# Patient Record
Sex: MALE | URBAN - METROPOLITAN AREA
[De-identification: names, ages, dates, MRNs, and addresses within clinical notes are randomized per-mention and may not be internally consistent; named-entity substitution may affect disease eponyms.]

---

## 2018-11-27 ENCOUNTER — OFFICE VISIT (OUTPATIENT)
Dept: CARDIOLOGY | Facility: CLINIC | Age: 43
End: 2018-11-27
Attending: INTERNAL MEDICINE
Payer: COMMERCIAL

## 2018-11-27 VITALS
OXYGEN SATURATION: 96 % | SYSTOLIC BLOOD PRESSURE: 131 MMHG | BODY MASS INDEX: 26.56 KG/M2 | HEIGHT: 72 IN | WEIGHT: 196.1 LBS | DIASTOLIC BLOOD PRESSURE: 88 MMHG | HEART RATE: 72 BPM

## 2018-11-27 DIAGNOSIS — R07.89 ATYPICAL CHEST PAIN: Primary | ICD-10-CM

## 2018-11-27 PROCEDURE — G0463 HOSPITAL OUTPT CLINIC VISIT: HCPCS | Mod: ZF

## 2018-11-27 PROCEDURE — 99205 OFFICE O/P NEW HI 60 MIN: CPT | Mod: ZP | Performed by: INTERNAL MEDICINE

## 2018-11-27 RX ORDER — ROSUVASTATIN CALCIUM 5 MG/1
5 TABLET, COATED ORAL DAILY
Qty: 90 TABLET | Refills: 3 | Status: SHIPPED | OUTPATIENT
Start: 2018-11-27

## 2018-11-27 RX ORDER — LORAZEPAM 1 MG/1
TABLET ORAL
Refills: 0 | COMMUNITY
Start: 2018-10-15

## 2018-11-27 RX ORDER — VALACYCLOVIR HYDROCHLORIDE 500 MG/1
TABLET, FILM COATED ORAL
Refills: 0 | COMMUNITY
Start: 2018-10-15

## 2018-11-27 RX ORDER — METOPROLOL TARTRATE 50 MG
TABLET ORAL
Qty: 2 TABLET | Refills: 0 | Status: SHIPPED | OUTPATIENT
Start: 2018-11-27 | End: 2019-02-01

## 2018-11-27 RX ORDER — CHLORAL HYDRATE 500 MG
2 CAPSULE ORAL DAILY
COMMUNITY

## 2018-11-27 ASSESSMENT — PAIN SCALES - GENERAL: PAINLEVEL: NO PAIN (0)

## 2018-11-27 NOTE — PATIENT INSTRUCTIONS
Patient Instructions:  Schedule coronary CT angiogram at your convenience.  Begin taking Crestor. RX sent to pharmacy.  Schedule follow up in 3 months for lab appointment and same day follow up with Dr. Levine.      It was a pleasure to see you in the cardiology clinic today.    We are encouraging the use of [employer] [facility] [employer] [facility] [employer] to communicate with your Healthcare Provider.  If you have any questions, call  Levi Tian LPN, at [phone].  Press Option #1 for the [facility], and then press Option #3 for nursing.      If you have an urgent need after hours (8:00 am to 4:30 pm) please call [phone] and ask for the cardiology fellow on call.      You are scheduled for a CT Coronary Angiogram at the [facility].   Please report to the [facility] Waiting Room in the [facility].    Follow these instructions:  1.The day before the test drink extra water and also on the day of the test.  2. Nothing to eat or drink except water 3 hours prior.  3. No caffeine, smoking, or strenuous activity before test.    4. You will be receiving contrast. You will need pre-treatment if you have allergies to contrast dye or shellfish.   5. You will need to have a current creatinine level within 30 days if you are over the age of 69, diabetic, or have a history of kidney problems.   6. HOLD these medications:   1. oral diabetic medications the morning of and wait 48 hours before  re-starting metformin.     2. Diuretic the day of.   3. NSAIDS (ibuprofen, naproxen) day of.  7. If your heart rate is above 90, please take this beta blocker the day of: metoprolol tartrate (LOPRESSOR)     8. Testing takes about 15 min. Please allow 2 hours for test as you may need medications to slow your heart rate for the test.      If your question concerns the above instructions, contact:  Levi Tian LPN  Nurse Care Coordinator- Heart Care  [phone]

## 2018-11-27 NOTE — MR AVS SNAPSHOT
After Visit Summary   11/27/2018    Brian Stratton    MRN: [id number]           Patient Information     Date Of Birth          1975        Visit Information        Provider Department      11/27/2018 1:00 PM ROCK Chiang MD [facility]        Today's Diagnoses     Atypical chest pain    -  1      Care Instructions    Patient Instructions:  Schedule coronary CT angiogram at your convenience.  Begin taking Crestor. RX sent to pharmacy.  Schedule follow up in 3 months for lab appointment and same day follow up with Dr. Levine.      It was a pleasure to see you in the cardiology clinic today.    We are encouraging the use of [employer] to communicate with your Healthcare Provider.  If you have any questions, call  Levi Tian LPN, at [phone].  Press Option #1 for the [facility], and then press Option #3 for nursing.      If you have an urgent need after hours (8:00 am to 4:30 pm) please call [phone] and ask for the cardiology fellow on call.      You are scheduled for a CT Coronary Angiogram at the [facility].   Please report to the [facility] Waiting Room in the [facility].    Follow these instructions:  1.The day before the test drink extra water and also on the day of the test.  2. Nothing to eat or drink except water 3 hours prior.  3. No caffeine, smoking, or strenuous activity before test.    4. You will be receiving contrast. You will need pre-treatment if you have allergies to contrast dye or shellfish.   5. You will need to have a current creatinine level within 30 days if you are over the age of 69, diabetic, or have a history of kidney problems.   6. HOLD these medications:   1. oral diabetic medications the morning of and wait 48 hours before  re-starting metformin.     2. Diuretic the day of.   3. NSAIDS (ibuprofen, naproxen) day of.  7. If your heart rate is above 90, please take this beta blocker  the day of: metoprolol tartrate (LOPRESSOR)     8. Testing takes about 15 min. Please allow 2 hours for test as you may need medications to slow your heart rate for the test.      If your question concerns the above instructions, contact:  Levi Tian LPN  Nurse Care Coordinator- Heart Care  [phone]              Follow-ups after your visit        Additional Services     Follow-Up with Cardiologist                 Follow-up notes from your care team     Return in about 3 months (around 2/27/2019).      Your next 10 appointments already scheduled     Dec 10, 2018 12:00 PM CST   CTA ANGIOGRAM CORONARY ARTERY with UUCT4, UU CV CT NURSE   [facility], [place], [place] ([facility], [facility])    [address] [facility] [address] [phone]           How do I prepare for my exam? (Food and drink instructions) Follow the instructions below: The day before your exam, drink extra fluids at least six 8-ounce glasses (unless your doctor wants you to restrict your fluids). No caffeine and no smoking the day of the test.  Do not eat or drink for 2 hours before your exam. You may take your morning medicines with small sips of water.  How do I prepare for my exam? (Other instructions) Follow the instructions below: If you take Viagra, Levitra or Cialis, stop taking it for 48 hours before your test.  For patients with diabetes: You may need a blood test (creatinine test) within 30 days of your exam; the Cardiologist/Radiologist may require you to get this test prior to your exam  If you are diabetic and take oral hypoglycemics, do not take them on the day of your test.  What should I wear: Please wear loose clothing, such as a sweat suit or jogging clothes. Avoid snaps, zippers and other metal. We may ask you to undress and put on a hospital gown.  How long does the exam take: Please allow two hours for this test.  What should I bring: Bring any scans or X-rays taken at other  hospitals, if similar tests were done. Also bring a list of your medicines, including vitamins, minerals and over-the-counter drugs. It is safest to leave personal items at home.  Do I need a : No  is needed.  What do I need to tell my doctor? Be sure to tell your doctor: * If you have any allergies, including any reaction to contrast. * If you are breastfeeding or there is a chance you are pregnant.  What should I do after the exam: No restrictions, You may resume normal activities.  What is this test: This test looks at your heart and heart arteries. A CT (computed tomography) scan is a series of pictures that allows us to look inside your body. Our scanner creates images of the body in cross sections, much like slices of bread. This helps us see any problems more clearly. Before the scan, we will give you contrast fluid (X-ray dye) through an IV (small needle in your arm). The contrast helps your blood vessels show up more clearly on the pictures.  Who should I call with questions: If you have any questions, please call the Imaging Department where you will have your exam. Directions, parking instructions, and other information is available on our website, [email or web address].            Feb 19, 2019  8:00 AM CST   Lab with  LAB   [facility] ([facility])    [address] [address] [phone]            Feb 19, 2019  8:30 AM CST   (Arrive by 8:15 AM)   Return Visit with ROCK Chiang MD   [facility] ([facility])    [address] [facility]  [address] [place] [id number] [phone]              Future tests that were ordered for you today     Open Future Orders        Priority Expected Expires Ordered    Radiologist Consult For Cardiology Routine 11/27/2018 11/27/2019 11/27/2018    Follow-Up with Cardiologist Routine 2/25/2019 11/27/2019 11/27/2018    Lipid panel reflex to direct LDL  "Fasting Routine 2019    Hepatic panel Routine 2019    CT Angiogram coronary artery Routine 2018            Who to contact     If you have questions or need follow up information about today's clinic visit or your schedule please contact [facility] directly at [phone].  Normal or non-critical lab and imaging results will be communicated to you by [employer], letter or phone within 4 business days after the clinic has received the results. If you do not hear from us within 7 days, please contact the clinic through [email or web address] or phone. If you have a critical or abnormal lab result, we will notify you by phone as soon as possible.  Submit refill requests through [employer] or call your pharmacy and they will forward the refill request to us. Please allow 3 business days for your refill to be completed.          Additional Information About Your Visit        [employer] Information     [employer] lets you send messages to your doctor, view your test results, renew your prescriptions, schedule appointments and more. To sign up, go to [email or web address] . Click on \"Log in\" on the left side of the screen, which will take you to the Welcome page. Then click on \"Sign up Now\" on the right side of the page.     You will be asked to enter the access code listed below, as well as some personal information. Please follow the directions to create your username and password.     Your access code is: [email or web address]  Expires: 2019  6:30 AM     Your access code will  in 90 days. If you need help or a new code, please call your [place] clinic or [phone].        Care EveryWhere ID     This is your Care EveryWhere ID. This could be used by other organizations to access your [place] medical records  [id number]        Your Vitals Were     Pulse Height Pulse Oximetry BMI (Body Mass Index)          72 1.829 m (6') 96% 26.6 kg/m2         " Blood Pressure from Last 3 Encounters:   11/27/18 131/88    Weight from Last 3 Encounters:   11/27/18 89 kg (196 lb 1.6 oz)                 Today's Medication Changes          These changes are accurate as of 11/27/18  2:05 PM.  If you have any questions, ask your nurse or doctor.               Start taking these medicines.        Dose/Directions    metoprolol tartrate 50 MG tablet   Commonly known as:  LOPRESSOR   Used for:  Atypical chest pain   Started by:  ROCK Chiang MD        Take 100 mg morning of procedure   Quantity:  2 tablet   Refills:  0       rosuvastatin 5 MG tablet   Commonly known as:  CRESTOR   Used for:  Atypical chest pain   Started by:  ROCK Chiang MD        Dose:  5 mg   Take 1 tablet (5 mg) by mouth daily   Quantity:  90 tablet   Refills:  3            Where to get your medicines      These medications were sent to [id number]  [address] - [address]   [address] [address]     Phone:  [phone]     metoprolol tartrate 50 MG tablet    rosuvastatin 5 MG tablet                Primary Care Provider Office Phone # Fax #    [facility] [phone]       [facility] [id number]  [place] [address]        Equal Access to Services     [facility] AH: Hadii aad ku hadasho Soomaali, waaxda luqadaha, qaybta kaalmada adeegyada, waxay yvanin hayrachel acharya . So [facility] [phone].    ATENCIÓN: Si habla español, tiene a johnson disposición servicios gratuitos de asistencia lingüística. ame al [phone].    We comply with applicable federal civil rights laws and [place] laws. We do not discriminate on the basis of race, color, national origin, age, disability, sex, sexual orientation, or gender identity.            Thank you!     Thank you for choosing [facility]  for your care. Our goal is always to provide you with excellent care. Hearing back from our patients is one  way we can continue to improve our services. Please take a few minutes to complete the written survey that you may receive in the mail after your visit with us. Thank you!             Your Updated Medication List - Protect others around you: Learn how to safely use, store and throw away your medicines at www.disposemymeds.org.          This list is accurate as of 11/27/18  2:05 PM.  Always use your most recent med list.                   Brand Name Dispense Instructions for use Diagnosis    fish oil-omega-3 fatty acids 1000 MG capsule      Take 2 g by mouth daily        LORazepam 1 MG tablet    ATIVAN     TK 1 T PO Q 12 H PRA        metoprolol tartrate 50 MG tablet    LOPRESSOR    2 tablet    Take 100 mg morning of procedure    Atypical chest pain       MULTI FOR HIM PO           rosuvastatin 5 MG tablet    CRESTOR    90 tablet    Take 1 tablet (5 mg) by mouth daily    Atypical chest pain       valACYclovir 500 MG tablet    VALTREX     TK 1 T PO D

## 2018-11-27 NOTE — LETTER
2018      RE: Brian Stratton  [address] [address]       Dear Colleague,    Thank you for the opportunity to participate in the care of your patient, Brian Stratton, at the [facility]. Please see a copy of my visit note below.    Cardiac Testing: Patient given instructions regarding  Coronary CTA. Discussed purpose, preparation, procedure and when to expect results reported back to the patient. Patient demonstrated understanding of this information and agreed to call with further questions or concerns.  Labs: Patient was given results of the laboratory testing obtained today. Patient was instructed to return for the next laboratory testing in 3 months . Patient demonstrated understanding of this information and agreed to call with further questions or concerns.   Med Reconcile: Reviewed and verified all current medications with the patient. The updated medication list was printed and given to the patient.  New Medication: Patient was educated regarding newly prescribed medication, including discussion of  the indication, administration, side effects, and when to report to MD or RN. Patient demonstrated understanding of this information and agreed to call with further questions or concerns.  Return Appointment: Patient given instructions regarding scheduling next clinic visit. Patient demonstrated understanding of this information and agreed to call with further questions or concerns.  Patient stated he understood all health information given and agreed to call with further questions or concerns.         SUBJECTIVE:  Brian Stratton is a 43 year old male who presents for evaluation of chest     Do have a very stressful job as . States his son had TGA and was operated as a new born. Single parent.    His concern is everybody on his maternal side  before age 50 with MI.  His 3 years older brother is Okay but his  father had MI at age 70. 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  Feels chest pain,at times sharp and at times tightness. Last long time. Activity,breathing do not have any effect. No associated symptoms. He is very worried if something happens to him, who will take care of his 9 year old boy with heart problem.    . No DM/HTN. Non smoker. F/H as above.    There are no active problems to display for this patient.   .  Current Outpatient Prescriptions   Medication Sig     fish oil-omega-3 fatty acids 1000 MG capsule Take 2 g by mouth daily     LORazepam (ATIVAN) 1 MG tablet TK 1 T PO Q 12 H PRA     Multiple Vitamins-Minerals (MULTI FOR HIM PO)      valACYclovir (VALTREX) 500 MG tablet TK 1 T PO D     No current facility-administered medications for this visit.      No past medical history on file.  No past surgical history on file.  Allergies   Allergen Reactions     Bees Shortness Of Breath     Social History     Social History     Marital status: Single     Spouse name: N/A     Number of children: N/A     Years of education: N/A     Occupational History     Not on file.     Social History Main Topics     Smoking status: Never Smoker     Smokeless tobacco: Never Used     Alcohol use Yes     Drug use: No     Sexual activity: Not on file     Other Topics Concern     Not on file     Social History Narrative     No narrative on file     No family history on file.       REVIEW OF SYSTEMS:  General: negative, fever, chills, night sweats  Skin: negative, acne, rash and scaling  Eyes: negative, double vision, eye pain and photophobia  Ears/Nose/Throat: negative, nasal congestion and purulent rhinorrhea  Respiratory: No dyspnea on exertion, No cough, No hemoptysis and negative  Cardiovascular: negative, palpitations, tachycardia, irregular heart beat, chest pain, exertional chest pain or pressure, paroxysmal nocturnal dyspnea, dyspnea on  exertion and orthopnea  Gastrointestinal: negative, dysphagia, nausea and vomiting  Genitourinary: negative, nocturia, dysuria and frequency  Musculoskeletal: negative, fracture, back pain and neck pain  Neurologic: negative, headaches, syncope, stroke and seizures  Psychiatric: negative, nervous breakdown, thoughts of self-harm and thoughts of hurting someone else  Hematologic/Lymphatic/Immunologic: negative, bleeding disorder, chills and fever  Endocrine: negative, cold intolerance, heat intolerance and hot flashes       OBJECTIVE:  Blood pressure 131/88, pulse 72, height 1.829 m (6'), weight 89 kg (196 lb 1.6 oz), SpO2 96 %.  General Appearance: healthy, alert, active and no distress  Head: Normocephalic. No masses, lesions, tenderness or abnormalities   Eyes: conjuctiva clear, PERRL, EOM intact  Ears: External ears normal. Canals clear. TM's normal.  Nose: Nares normal  Mouth: normal  Neck: Supple, no cervical adenopathy, no thyromegaly  Lungs: clear to auscultation  Cardiac: regular rate and rhythm, normal S1 and S2, no murmur  Abdomen: Soft, nontender.  Normal bowel sounds.  No hepatosplenomegaly or abnormal masses  Extremities: no peripheral edema, peripheral pulses normal  Musculoskeletal: negative  Neurological: Cranial nerves 2-12 intact, motor strength intact       ASSESSMENT/PLAN:  Patient with atypical chest pain.  No relation to activity.  . F/H +ve. Non smoker. No DM.  Discussed options. Stress test,coronary angiogram and CTA.  Discussed the fact that stress test is not 100% accurate and can miss in significant percentage with single vessel diase and angiogram too invasive for his symptoms.  Opted to proceed with CTA.  Will start on Creastor 5 mg daily and repeat LFTs and Lipid profile in 3 months. [facility]  Per orders.   Return to Clinic 3 months.    Please do not hesitate to contact me if you have any questions/concerns.     Sincerely,     ROCK Chiang MD

## 2018-11-27 NOTE — PROGRESS NOTES
SUBJECTIVE:  Brian Stratton is a 43 year old male who presents for evaluation of chest     Do have a very stressful job as . States his son had TGA and was operated as a new born. Single parent.    His concern is everybody on his maternal side  before age 50 with MI.  His 3 years older brother is Okay but his father had MI at age 70. 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  Feels chest pain,at times sharp and at times tightness. Last long time. Activity,breathing do not have any effect. No associated symptoms. He is very worried if something happens to him, who will take care of his 9 year old boy with heart problem.    . No DM/HTN. Non smoker. F/H as above.    There are no active problems to display for this patient.   .  Current Outpatient Prescriptions   Medication Sig     fish oil-omega-3 fatty acids 1000 MG capsule Take 2 g by mouth daily     LORazepam (ATIVAN) 1 MG tablet TK 1 T PO Q 12 H PRA     Multiple Vitamins-Minerals (MULTI FOR HIM PO)      valACYclovir (VALTREX) 500 MG tablet TK 1 T PO D     No current facility-administered medications for this visit.      No past medical history on file.  No past surgical history on file.  Allergies   Allergen Reactions     Bees Shortness Of Breath     Social History     Social History     Marital status: Single     Spouse name: N/A     Number of children: N/A     Years of education: N/A     Occupational History     Not on file.     Social History Main Topics     Smoking status: Never Smoker     Smokeless tobacco: Never Used     Alcohol use Yes     Drug use: No     Sexual activity: Not on file     Other Topics Concern     Not on file     Social History Narrative     No narrative on file     No family history on file.       REVIEW OF SYSTEMS:  General: negative, fever, chills, night sweats  Skin: negative, acne, rash and scaling  Eyes: negative, double vision,  eye pain and photophobia  Ears/Nose/Throat: negative, nasal congestion and purulent rhinorrhea  Respiratory: No dyspnea on exertion, No cough, No hemoptysis and negative  Cardiovascular: negative, palpitations, tachycardia, irregular heart beat, chest pain, exertional chest pain or pressure, paroxysmal nocturnal dyspnea, dyspnea on exertion and orthopnea  Gastrointestinal: negative, dysphagia, nausea and vomiting  Genitourinary: negative, nocturia, dysuria and frequency  Musculoskeletal: negative, fracture, back pain and neck pain  Neurologic: negative, headaches, syncope, stroke and seizures  Psychiatric: negative, nervous breakdown, thoughts of self-harm and thoughts of hurting someone else  Hematologic/Lymphatic/Immunologic: negative, bleeding disorder, chills and fever  Endocrine: negative, cold intolerance, heat intolerance and hot flashes       OBJECTIVE:  Blood pressure 131/88, pulse 72, height 1.829 m (6'), weight 89 kg (196 lb 1.6 oz), SpO2 96 %.  General Appearance: healthy, alert, active and no distress  Head: Normocephalic. No masses, lesions, tenderness or abnormalities   Eyes: conjuctiva clear, PERRL, EOM intact  Ears: External ears normal. Canals clear. TM's normal.  Nose: Nares normal  Mouth: normal  Neck: Supple, no cervical adenopathy, no thyromegaly  Lungs: clear to auscultation  Cardiac: regular rate and rhythm, normal S1 and S2, no murmur  Abdomen: Soft, nontender.  Normal bowel sounds.  No hepatosplenomegaly or abnormal masses  Extremities: no peripheral edema, peripheral pulses normal  Musculoskeletal: negative  Neurological: Cranial nerves 2-12 intact, motor strength intact       ASSESSMENT/PLAN:  Patient with atypical chest pain.  No relation to activity.  . F/H +ve. Non smoker. No DM.  Discussed options. Stress test,coronary angiogram and CTA.  Discussed the fact that stress test is not 100% accurate and can miss in significant percentage with single vessel diase and angiogram too  invasive for his symptoms.  Opted to proceed with CTA.  Will start on Creastor 5 mg daily and repeat LFTs and Lipid profile in 3 months. [facility]  Per orders.   Return to Clinic 3 months.

## 2018-11-27 NOTE — PROGRESS NOTES
Cardiac Testing: Patient given instructions regarding  Coronary CTA. Discussed purpose, preparation, procedure and when to expect results reported back to the patient. Patient demonstrated understanding of this information and agreed to call with further questions or concerns.  Labs: Patient was given results of the laboratory testing obtained today. Patient was instructed to return for the next laboratory testing in 3 months . Patient demonstrated understanding of this information and agreed to call with further questions or concerns.   Med Reconcile: Reviewed and verified all current medications with the patient. The updated medication list was printed and given to the patient.  New Medication: Patient was educated regarding newly prescribed medication, including discussion of  the indication, administration, side effects, and when to report to MD or RN. Patient demonstrated understanding of this information and agreed to call with further questions or concerns.  Return Appointment: Patient given instructions regarding scheduling next clinic visit. Patient demonstrated understanding of this information and agreed to call with further questions or concerns.  Patient stated he understood all health information given and agreed to call with further questions or concerns.

## 2018-11-27 NOTE — NURSING NOTE
Vitals completed successfully and medication reconciled. Stephania Boggs MA  Chief Complaint   Patient presents with     New Patient      new patient establishing care,  family history of heart disease and stroke

## 2018-12-10 ENCOUNTER — HOSPITAL ENCOUNTER (OUTPATIENT)
Dept: CT IMAGING | Facility: CLINIC | Age: 43
Discharge: HOME | End: 2018-12-10
Attending: INTERNAL MEDICINE | Admitting: INTERNAL MEDICINE
Payer: COMMERCIAL

## 2018-12-10 VITALS — SYSTOLIC BLOOD PRESSURE: 112 MMHG | DIASTOLIC BLOOD PRESSURE: 75 MMHG | RESPIRATION RATE: 16 BRPM

## 2018-12-10 DIAGNOSIS — R07.89 ATYPICAL CHEST PAIN: ICD-10-CM

## 2018-12-10 PROCEDURE — 25000128 H RX IP 250 OP 636: Performed by: INTERNAL MEDICINE

## 2018-12-10 PROCEDURE — 25000132 ZZH RX MED GY IP 250 OP 250 PS 637: Performed by: INTERNAL MEDICINE

## 2018-12-10 PROCEDURE — 75574 CT ANGIO HRT W/3D IMAGE: CPT | Mod: 26 | Performed by: INTERNAL MEDICINE

## 2018-12-10 PROCEDURE — 75574 CT ANGIO HRT W/3D IMAGE: CPT

## 2018-12-10 RX ORDER — NITROGLYCERIN 0.4 MG/1
.4-.8 TABLET SUBLINGUAL
Status: DISCONTINUED | OUTPATIENT
Start: 2018-12-10 | End: 2018-12-11 | Stop reason: HOSPADM

## 2018-12-10 RX ORDER — METOPROLOL TARTRATE 1 MG/ML
5-15 INJECTION, SOLUTION INTRAVENOUS
Status: DISCONTINUED | OUTPATIENT
Start: 2018-12-10 | End: 2018-12-11 | Stop reason: HOSPADM

## 2018-12-10 RX ORDER — IOPAMIDOL 755 MG/ML
120 INJECTION, SOLUTION INTRAVASCULAR ONCE
Status: COMPLETED | OUTPATIENT
Start: 2018-12-10 | End: 2018-12-10

## 2018-12-10 RX ORDER — ACYCLOVIR 200 MG/1
0-1 CAPSULE ORAL
Status: DISCONTINUED | OUTPATIENT
Start: 2018-12-10 | End: 2018-12-11 | Stop reason: HOSPADM

## 2018-12-10 RX ORDER — METOPROLOL TARTRATE 50 MG
50-100 TABLET ORAL
Status: DISCONTINUED | OUTPATIENT
Start: 2018-12-10 | End: 2018-12-11 | Stop reason: HOSPADM

## 2018-12-10 RX ADMIN — NITROGLYCERIN 0.8 MG: 0.4 TABLET SUBLINGUAL at 12:05

## 2018-12-10 RX ADMIN — IOPAMIDOL 120 ML: 755 INJECTION, SOLUTION INTRAVENOUS at 12:03

## 2018-12-10 NOTE — PROGRESS NOTES
Pt arrived for CTA. Test, meds and side effects reviewed with pt. Resting HR 50's. Pt took 100mg PO Metoprolol prior to arrival. Administered 0.8 mg SL nitro on CTA table per order. CTA completed; tolerated procedure well. Post monitoring completed and VSS. D/C instructions reviewed with pt whom verbalized understanding of need to increase PO fluids today. D/C to [facility] waiting room accompanied by staff.

## 2018-12-11 DIAGNOSIS — E78.2 MIXED HYPERLIPIDEMIA: Primary | ICD-10-CM

## 2018-12-14 ENCOUNTER — TELEPHONE (OUTPATIENT)
Dept: CARDIOLOGY | Facility: CLINIC | Age: 43
End: 2018-12-14

## 2018-12-14 NOTE — TELEPHONE ENCOUNTER
Spoke with patient and reiterated the results and plan that was previously discussed on 12/11/2018. Patient stated he thought someone was going to call him to help him schedule an appointment to further follow up on his pulmonary nodules. Patient request a call back from Levi on Monday if possible to clarify next plan of care. Patient states understanding and agrees to call with any questions or concerns.

## 2018-12-14 NOTE — TELEPHONE ENCOUNTER
[facility] Call Center    Phone Message    May a detailed message be left on voicemail: yes    Reason for Call: Other: Per call from PT he spoke with nurse Levi that works with Dr Levine on Tuesday re: his test results and was supposed hear back some time this week to discuss plans. PT is requesting for a call back      Action Taken: Message routed to:  [facility] ([facility]): Cardiology

## 2018-12-17 NOTE — TELEPHONE ENCOUNTER
Spoke with patient and discussed that he wants to follow up with [facility].  They referred him to [facility], was seen by him, and is scheduled for 3 month follow up.  Message sent to Ly Rodriguez to follow up for scheduling.

## 2019-01-25 DIAGNOSIS — Z13.6 SCREENING FOR CARDIOVASCULAR CONDITION: ICD-10-CM

## 2019-01-25 DIAGNOSIS — R93.1 AGATSTON CORONARY ARTERY CALCIUM SCORE LESS THAN 100: Primary | ICD-10-CM

## 2019-02-01 ENCOUNTER — OFFICE VISIT (OUTPATIENT)
Dept: CARDIOLOGY | Facility: CLINIC | Age: 44
End: 2019-02-01
Payer: COMMERCIAL

## 2019-02-01 VITALS
DIASTOLIC BLOOD PRESSURE: 77 MMHG | HEIGHT: 72 IN | WEIGHT: 195 LBS | BODY MASS INDEX: 26.41 KG/M2 | SYSTOLIC BLOOD PRESSURE: 129 MMHG | HEART RATE: 70 BPM | OXYGEN SATURATION: 96 %

## 2019-02-01 DIAGNOSIS — Z13.6 SCREENING FOR CARDIOVASCULAR CONDITION: ICD-10-CM

## 2019-02-01 DIAGNOSIS — E78.5 HYPERLIPIDEMIA LDL GOAL <100: Primary | ICD-10-CM

## 2019-02-01 DIAGNOSIS — R93.1 AGATSTON CORONARY ARTERY CALCIUM SCORE LESS THAN 100: ICD-10-CM

## 2019-02-01 LAB
ALT SERPL W P-5'-P-CCNC: 31 U/L (ref 0–70)
AST SERPL W P-5'-P-CCNC: 17 U/L (ref 0–45)
CHOLEST SERPL-MCNC: 172 MG/DL
CREAT UR-MCNC: 171 MG/DL
CRP SERPL HS-MCNC: 7.3 MG/L
FEF 25/75: NORMAL
FEV-1: NORMAL
FEV1/FVC: NORMAL
FVC: NORMAL
GLUCOSE SERPL-MCNC: 96 MG/DL (ref 70–99)
HDLC SERPL-MCNC: 46 MG/DL
LDLC SERPL CALC-MCNC: 110 MG/DL
MICROALBUMIN UR-MCNC: 10 MG/L
MICROALBUMIN/CREAT UR: 5.6 MG/G CR (ref 0–17)
NONHDLC SERPL-MCNC: 126 MG/DL
NT-PROBNP SERPL-MCNC: 11 PG/ML (ref 0–125)
TRIGL SERPL-MCNC: 84 MG/DL

## 2019-02-01 RX ORDER — EPINEPHRINE 0.3 MG/.3ML
0.3 INJECTION SUBCUTANEOUS PRN
Refills: 1 | COMMUNITY
Start: 2018-11-27

## 2019-02-01 ASSESSMENT — MIFFLIN-ST. JEOR: SCORE: 1812.51

## 2019-02-01 NOTE — LETTER
2/1/2019      RE: Brian Stratton  [address] [place] [id number]       Dear Colleague,    Thank you for the opportunity to participate in the care of your patient, Brian Stratton, at the [facility]ARD[facility] PREVENTION at [facility]. Please see a copy of my visit note below.    [facility] for Cardiovascular Disease Prevention - Exam Note    Active Problems   There are no active problems to display for this patient.    Reason For Visit   Patient here for [facility] early detection of atherosclerosis and CVD exam.    Pain Evaluation  Current history of pain associated with this visit is: denied.     HPI   Brian Stratton is a 43 year old year old male with a family history of hyperlipidemia with LDL up to 191 possibly familial hyperlipidemia who saw cardiologist Dr. Chiang who obtained a CAC score of 76  . He was started Crestor 5 mg per day. He noticed some muscle aching for a short time but now feels well on the medication.     He injured his knee last year so has not been able to be as active as he would like and his weight has increased up to 240 but currently down to 195.  He has had atypical chest pian for about 5 years described as a sharp pain that feels like stabbing across his left chest area axilla to left pectoral muscle about once a week for a few seconds. He now and then feels like his heart rate may be to fast with sexual activity.     Nutrition assessment per patient report:   He generally tried to eat healthy and we discussed avoidance of foods with saturated fat.   Foods with fat/cholesterol (fried foods, fatty meats, junk food):  3 servings per week   Fruits and vegetables (  cup cooked, 1 cup raw):  4 servings per day  Caffeine (1 cup coffee, soda, etc):  3 servings per day, coffee  Alcohol servings (12 oz. beer, 4 oz. wine, 1  oz. in mixed drink):  3 servings per week  Calcium servings (dairy foods, 8 oz.  milk, yogurt, cheese, ice cream):  2 servings per day  Salt/sodium use:  light use  Special dietary habits:  higher fiber    Activity  Patient is active 3-4 times per week for 60 or more minutes with walking. He used to run prior to his knee injury. We discussed non weight bearing exercise as he is trying to gradually work back into more exercise. His knee does not hurt with non weight bearing exercise but running still causes some pain. He may do well with biking.    Laboratory Results Review  We discussed laboratory results today including lipids targets and how foods influence cholesterol.    Weight  His perceived healthy weight is 185-190 pounds.  A normal BMI of 25 is equal to 189 pounds.  The current BMI of 26.45 is overweight range.  A weight reduction speed of two pounds per month  is recommended.    PMH   Past Medical History:   Diagnosis Date     Agatston coronary artery calcium score less than 100     Total 76, Mild (25-49%)  proximal LAD and Mid LAD     Atypical chest pain      Hyperlipidemia     hx of      Knee injury 2018    torn meniscus       PSH  Past Surgical History:   Procedure Laterality Date     AS ARTHROTOMY,OPEN REPAIR MENISCUS Right 08/2018     eye surgery  2005    laser     RHINOPLASTY  1997       Current Meds   Current Outpatient Medications   Medication Sig Dispense Refill     aspirin (ASA) 81 MG tablet Take 81 mg by mouth daily       fish oil-omega-3 fatty acids 1000 MG capsule Take 2 g by mouth daily       LORazepam (ATIVAN) 1 MG tablet TK 1 T PO Q 12 H PRA  0     Multiple Vitamins-Minerals (MULTI FOR HIM PO)        rosuvastatin (CRESTOR) 5 MG tablet Take 1 tablet (5 mg) by mouth daily 90 tablet 3     valACYclovir (VALTREX) 500 MG tablet TK 1 T PO D  0     EPINEPHrine (EPIPEN/ADRENACLICK/OR ANY BX GENERIC EQUIV) 0.3 MG/0.3ML injection 2-pack Inject 0.3 mg into the muscle as needed  1       Allergies      Allergies   Allergen Reactions     Bees Shortness Of Breath       Family Hx    Family History   Problem Relation Age of Onset     Hypertension Mother      Hypoglycemia Mother      Coronary Artery Disease Father 70     Hypoglycemia Father      Hyperlipidemia Father      Hyperlipidemia Brother      Pancreatic Cancer Maternal Grandmother      Cerebrovascular Disease Maternal Grandmother      Chronic Obstructive Pulmonary Disease Maternal Grandfather      Heart Failure Maternal Grandfather      Other - See Comments Paternal Grandmother         all her siblings  of CAD in 50's     Other - See Comments Other         great uncles on moms side  in 30's of MI     Other - See Comments Half-Brother         over weight     Intellectual Disability (Mental Retardation) Half-Brother      Cancer Paternal Grandfather         related to job radiation exposure       Social History  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 He has a female partner and two children ages 10 and 11. His 10 year old son had transposition of the great vessel as a premature infant.      Enjoyment of life is 6 with 10 enjoys life fully.    Tobacco History  History   Smoking Status     Never Smoker   Smokeless Tobacco     Never Used       ROS  CONSTITUTIONAL:  No fever, chills, or sweats. No weight gain/loss.   EENT:  No visual disturbance, ear ache, epistaxis, sore throat  ALLERGIES/IMMUNOLOGIC:  Negative  RESPIRATORY:  No cough, hemoptysis  CARDIOVASCULAR:  As per HPI  GI:  No nausea, vomiting, hematemesis, melena  :  No urinary frequency, dysuria, or hematuria  INTEGUMENT:  Negative  PSYCHIATRIC:  Negative  NEURO:  Negative  ENDOCRINE:  Negative  MUSCULOSKELETAL:  Negative     Vital Signs   /77 (BP Location: Right arm, Patient Position: Sitting, Cuff Size: Adult Regular)   Pulse 70   Ht 1.829 m (6')   Wt 88.5 kg (195 lb)   SpO2 96%   BMI 26.45 kg/m         Waist: 36 inches  Hip: 40 inches    Physical Exam   In general, the patient is a pleasant male in no apparent distress.    HEENT: NC/AT.   PERRLA.  EOMI.  Sclerae white, not injected.  Nares clear.  Pharynx without erythema or exudate.  Dentition intact.    Neck: No adenopathy.  No thyromegaly.Carotids +4/4 bilaterally without bruits.  No jugular venous distension.   Lungs: CTA.  No ronchi, wheezes, rales.     Cor: RRR. Normal S1, S2 splits physiologically. No murmur, rub, click, or gallop. The PMI is in the 5th ICS in the midclavicular line. There is no heave.   Abdomen: Soft, nontender, nondistended. No organomegaly.  No bruits.   Extremities: No clubbing, cyanosis, or edema. The pulses are +2/2 at the post-tibial sites bilaterally. No bruits are noted.    Recent Labs  Lab Results   Component Value Date    GLC 96 02/01/2019      Lab Results   Component Value Date    NTBNP 11 02/01/2019     No results found for: NTBNPI   Lab Results   Component Value Date    UCRR 171 02/01/2019      Lab Results   Component Value Date    MICROL 10 02/01/2019      No results found for: MICROALBUMIN   Lab Results   Component Value Date    CRP 7.3 02/01/2019      Lab Results   Component Value Date    CHOL 172 02/01/2019      Lab Results   Component Value Date    TRIG 84 02/01/2019      Lab Results   Component Value Date    HDL 46 02/01/2019      Lab Results   Component Value Date     (H) 02/01/2019      No results found for: VLDL   No results found for: CHOLHDLRATIO  Lab Results   Component Value Date    NHDL 126 02/01/2019          [facility] Test Results    BASIC SPIROMETRY: Summary of two attempts (see printout for details of results)  Results Estimated range for ht/age   FVC: 5.69 liter FVC: 3.42-5.72 liter   FEV1: 4.79 liter FEV1: 2.87-4.58 liter     History of asthma:  NO   History of respiratory infection current/recent:  NO     Spirometry Results:  normal      WALKING BLOOD PRESSURE RESPONSE (3 minute, 5 MET level walk)   Pre BP: 110/70mmHg  3 min BP: 130/66 mmHg  1 min post BP: 100/77 mmHg    Pre HR: 60 bpm  3 min HR: 121 bpm  1 min post HR: 75 bpm        RETINAL VASCULAR ASSESSMENT   Left Eye Abnormality:  none  AV Ratio: 0.60    Right Eye Abnormality:  none  AV Ratio: 0.66     Retinal Assessment:  borderline      ABDOMINAL AORTA ULTRASOUND (< 2.5 normal, borderline 2.5-2.9, abnormal > 3)   SupraIliac 2.12 cm    SupraRenal 1.94 cm    InfraRenal Proximal 1.93 cm    InfraRenal Distal 1.97 cm      Abdominal Aorta Assessment:  normal      LEFT VENTRICULAR ULTRASOUND MEASUREMENTS (adjusted for BSA)  LVIDD 48 mm   Septa 9.9 mm   Posterior 8.2 mm     Left Ventricular US Assessment:  normal      Carotid Artery IMT measurements report and plaques in the small area examined:   Left IMT 0.407 mm  Plaques small homogeneous plaque in left bulb area size 2.8 mm.     Right IMT 0.697 mm  Plaques none       ECG (see tracing):  normal sinus rhythm;  rate: 70 bpm      Arterial Elasticity per age and gender (see printout):   C1 20.7 mL/mmHg x 10  normal   C2 10.7 mL/mmHg x 100 normal   Supine blood pressure: 121/77 mmHg       Assessment:      Cardiovascular:  ECG normal sinur rate 69. Atypical chest pain, sharp at random not associated with exertion and no acceleration over 5 years. Nt pro bNP is normal. Small carotid plaque observed with carotid IMT as above.     Blood Pressure:  Resting blood pressure normal to elevated range today 117/77 to 129/77.     Lipids:  He had an excellent response to 5 mg of rosuvastatin with reduction in LDL from 191 to 110. Would target lower LDL given his age with CAC of 76. Recommend he increase rosuvastatin to 10 mg per day. He can do this gradually since he had some muscle pain with initiation.     Health Habit Summary:  Nutrition: Heart Healthy Eating:  most of the time   Exercise:  regularly active  Weight:  overweight range  Tobacco Use:  never used    Full report to follow prevention team review of test results with scanned final report.    Time spent for patient visit was 60 minutes with more than half the time spent on counseling and  coordination of care.    SENDY So CNP     CC  Patient Care Team:  Naveen Ramos as PCP - General (Family Practice)  ROCK CUMMINGS

## 2019-02-01 NOTE — PROGRESS NOTES
[facility] for Cardiovascular Disease Prevention - Exam Note    Active Problems   There are no active problems to display for this patient.      Reason For Visit   Patient here for [facility] early detection of atherosclerosis and CVD exam.    Pain Evaluation  Current history of pain associated with this visit is: denied.     SAVI   Brian Stratton is a 43 year old year old male with a family history of hyperlipidemia with LDL up to 191 possibly familial hyperlipidemia who saw cardiologist Dr. Chiang who obtained a CAC score of 76  . He was started Crestor 5 mg per day. He noticed some muscle aching for a short time but now feels well on the medication.     He injured his knee last year so has not been able to be as active as he would like and his weight has increased up to 240 but currently down to 195.  He has had atypical chest pian for about 5 years described as a sharp pain that feels like stabbing across his left chest area axilla to left pectoral muscle about once a week for a few seconds. He now and then feels like his heart rate may be to fast with sexual activity.     Nutrition assessment per patient report:   He generally tried to eat healthy and we discussed avoidance of foods with saturated fat.   Foods with fat/cholesterol (fried foods, fatty meats, junk food):  3 servings per week   Fruits and vegetables (  cup cooked, 1 cup raw):  4 servings per day  Caffeine (1 cup coffee, soda, etc):  3 servings per day, coffee  Alcohol servings (12 oz. beer, 4 oz. wine, 1  oz. in mixed drink):  3 servings per week  Calcium servings (dairy foods, 8 oz. milk, yogurt, cheese, ice cream):  2 servings per day  Salt/sodium use:  light use  Special dietary habits:  higher fiber    Activity  Patient is active 3-4 times per week for 60 or more minutes with walking. He used to run prior to his knee injury. We discussed non weight bearing exercise as he is trying to gradually work back into more exercise. His  knee does not hurt with non weight bearing exercise but running still causes some pain. He may do well with biking.    Laboratory Results Review  We discussed laboratory results today including lipids targets and how foods influence cholesterol.    Weight  His perceived healthy weight is 185-190 pounds.  A normal BMI of 25 is equal to 189 pounds.  The current BMI of 26.45 is overweight range.  A weight reduction speed of two pounds per month  is recommended.    PMH   Past Medical History:   Diagnosis Date     Agatston coronary artery calcium score less than 100     Total 76, Mild (25-49%)  proximal LAD and Mid LAD     Atypical chest pain      Hyperlipidemia     hx of      Knee injury 2018    torn meniscus       PSH  Past Surgical History:   Procedure Laterality Date     AS ARTHROTOMY,OPEN REPAIR MENISCUS Right 08/2018     eye surgery  2005    laser     RHINOPLASTY  1997       Current Meds   Current Outpatient Medications   Medication Sig Dispense Refill     aspirin (ASA) 81 MG tablet Take 81 mg by mouth daily       fish oil-omega-3 fatty acids 1000 MG capsule Take 2 g by mouth daily       LORazepam (ATIVAN) 1 MG tablet TK 1 T PO Q 12 H PRA  0     Multiple Vitamins-Minerals (MULTI FOR HIM PO)        rosuvastatin (CRESTOR) 5 MG tablet Take 1 tablet (5 mg) by mouth daily 90 tablet 3     valACYclovir (VALTREX) 500 MG tablet TK 1 T PO D  0     EPINEPHrine (EPIPEN/ADRENACLICK/OR ANY BX GENERIC EQUIV) 0.3 MG/0.3ML injection 2-pack Inject 0.3 mg into the muscle as needed  1       Allergies      Allergies   Allergen Reactions     Bees Shortness Of Breath       Family Hx   Family History   Problem Relation Age of Onset     Hypertension Mother      Hypoglycemia Mother      Coronary Artery Disease Father 70     Hypoglycemia Father      Hyperlipidemia Father      Hyperlipidemia Brother      Pancreatic Cancer Maternal Grandmother      Cerebrovascular Disease Maternal Grandmother      Chronic Obstructive Pulmonary Disease  Maternal Grandfather      Heart Failure Maternal Grandfather      Other - See Comments Paternal Grandmother         all her siblings  of CAD in 50's     Other - See Comments Other         great uncles on moms side  in 30's of MI     Other - See Comments Half-Brother         over weight     Intellectual Disability (Mental Retardation) Half-Brother      Cancer Paternal Grandfather         related to job radiation exposure       Social History  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 He has a female partner and two children ages 10 and 11. His 10 year old son had transposition of the great vessel as a premature infant.      Enjoyment of life is 6 with 10 enjoys life fully.    Tobacco History  History   Smoking Status     Never Smoker   Smokeless Tobacco     Never Used       ROS  CONSTITUTIONAL:  No fever, chills, or sweats. No weight gain/loss.   EENT:  No visual disturbance, ear ache, epistaxis, sore throat  ALLERGIES/IMMUNOLOGIC:  Negative  RESPIRATORY:  No cough, hemoptysis  CARDIOVASCULAR:  As per HPI  GI:  No nausea, vomiting, hematemesis, melena  :  No urinary frequency, dysuria, or hematuria  INTEGUMENT:  Negative  PSYCHIATRIC:  Negative  NEURO:  Negative  ENDOCRINE:  Negative  MUSCULOSKELETAL:  Negative     Vital Signs   /77 (BP Location: Right arm, Patient Position: Sitting, Cuff Size: Adult Regular)   Pulse 70   Ht 1.829 m (6')   Wt 88.5 kg (195 lb)   SpO2 96%   BMI 26.45 kg/m        Waist: 36 inches  Hip: 40 inches    Physical Exam   In general, the patient is a pleasant male in no apparent distress.    HEENT: NC/AT.  PERRLA.  EOMI.  Sclerae white, not injected.  Nares clear.  Pharynx without erythema or exudate.  Dentition intact.    Neck: No adenopathy.  No thyromegaly.Carotids +4/4 bilaterally without bruits.  No jugular venous distension.   Lungs: CTA.  No ronchi, wheezes, rales.     Cor: RRR. Normal S1, S2 splits physiologically. No murmur, rub, click, or  gallop. The PMI is in the 5th ICS in the midclavicular line. There is no heave.   Abdomen: Soft, nontender, nondistended. No organomegaly.  No bruits.   Extremities: No clubbing, cyanosis, or edema. The pulses are +2/2 at the post-tibial sites bilaterally. No bruits are noted.    Recent Labs  Lab Results   Component Value Date    GLC 96 02/01/2019      Lab Results   Component Value Date    NTBNP 11 02/01/2019     No results found for: NTBNPI   Lab Results   Component Value Date    UCRR 171 02/01/2019      Lab Results   Component Value Date    MICROL 10 02/01/2019      No results found for: MICROALBUMIN   Lab Results   Component Value Date    CRP 7.3 02/01/2019      Lab Results   Component Value Date    CHOL 172 02/01/2019      Lab Results   Component Value Date    TRIG 84 02/01/2019      Lab Results   Component Value Date    HDL 46 02/01/2019      Lab Results   Component Value Date     (H) 02/01/2019      No results found for: VLDL   No results found for: CHOLHDLRATIO  Lab Results   Component Value Date    NHDL 126 02/01/2019          [facility] Test Results    BASIC SPIROMETRY: Summary of two attempts (see printout for details of results)  Results Estimated range for ht/age   FVC: 5.69 liter FVC: 3.42-5.72 liter   FEV1: 4.79 liter FEV1: 2.87-4.58 liter     History of asthma:  NO   History of respiratory infection current/recent:  NO     Spirometry Results:  normal      WALKING BLOOD PRESSURE RESPONSE (3 minute, 5 MET level walk)   Pre BP: 110/70mmHg  3 min BP: 130/66 mmHg  1 min post BP: 100/77 mmHg    Pre HR: 60 bpm  3 min HR: 121 bpm  1 min post HR: 75 bpm       RETINAL VASCULAR ASSESSMENT   Left Eye Abnormality:  none  AV Ratio: 0.60    Right Eye Abnormality:  none  AV Ratio: 0.66     Retinal Assessment:  borderline      ABDOMINAL AORTA ULTRASOUND (< 2.5 normal, borderline 2.5-2.9, abnormal > 3)   SupraIliac 2.12 cm    SupraRenal 1.94 cm    InfraRenal Proximal 1.93 cm    InfraRenal Distal 1.97 cm       Abdominal Aorta Assessment:  normal      LEFT VENTRICULAR ULTRASOUND MEASUREMENTS (adjusted for BSA)  LVIDD 48 mm   Septa 9.9 mm   Posterior 8.2 mm     Left Ventricular US Assessment:  normal      Carotid Artery IMT measurements report and plaques in the small area examined:   Left IMT 0.407 mm  Plaques small homogeneous plaque in left bulb area size 2.8 mm.     Right IMT 0.697 mm  Plaques none       ECG (see tracing):  normal sinus rhythm;  rate: 70 bpm      Arterial Elasticity per age and gender (see printout):   C1 20.7 mL/mmHg x 10  normal   C2 10.7 mL/mmHg x 100 normal   Supine blood pressure: 121/77 mmHg       Assessment:      Cardiovascular:  ECG normal sinur rate 69. Atypical chest pain, sharp at random not associated with exertion and no acceleration over 5 years. Nt pro bNP is normal. Small carotid plaque observed with carotid IMT as above.     Blood Pressure:  Resting blood pressure normal to elevated range today 117/77 to 129/77.     Lipids:  He had an excellent response to 5 mg of rosuvastatin with reduction in LDL from 191 to 110. Would target lower LDL given his age with CAC of 76. Recommend he increase rosuvastatin to 10 mg per day. He can do this gradually since he had some muscle pain with initiation.     Health Habit Summary:  Nutrition: Heart Healthy Eating:  most of the time   Exercise:  regularly active  Weight:  overweight range  Tobacco Use:  never used    Full report to follow prevention team review of test results with scanned final report.    Time spent for patient visit was 60 minutes with more than half the time spent on counseling and coordination of care.    SENDY So CNP       CC  Patient Care Team:  Naveen Ramos as PCP - General (Family Practice)  ROCK CUMMINGS

## 2025-06-12 DIAGNOSIS — E78.5 HYPERLIPIDEMIA LDL GOAL <100: Primary | ICD-10-CM

## 2025-06-12 DIAGNOSIS — R93.1 AGATSTON CAC SCORE, <100: ICD-10-CM

## 2025-06-19 ENCOUNTER — LAB (OUTPATIENT)
Dept: LAB | Facility: CLINIC | Age: 50
End: 2025-06-19
Attending: NURSE PRACTITIONER
Payer: COMMERCIAL

## 2025-06-19 ENCOUNTER — OFFICE VISIT (OUTPATIENT)
Dept: CARDIOLOGY | Facility: CLINIC | Age: 50
End: 2025-06-19
Attending: NURSE PRACTITIONER
Payer: COMMERCIAL

## 2025-06-19 VITALS
SYSTOLIC BLOOD PRESSURE: 137 MMHG | OXYGEN SATURATION: 97 % | BODY MASS INDEX: 29.69 KG/M2 | WEIGHT: 212.1 LBS | HEIGHT: 71 IN | DIASTOLIC BLOOD PRESSURE: 100 MMHG

## 2025-06-19 DIAGNOSIS — R93.1 AGATSTON CAC SCORE, <100: ICD-10-CM

## 2025-06-19 DIAGNOSIS — E78.5 HYPERLIPIDEMIA LDL GOAL <100: Primary | ICD-10-CM

## 2025-06-19 DIAGNOSIS — E78.5 HYPERLIPIDEMIA LDL GOAL <100: ICD-10-CM

## 2025-06-19 DIAGNOSIS — E78.5 HYPERLIPIDEMIA, UNSPECIFIED HYPERLIPIDEMIA TYPE: ICD-10-CM

## 2025-06-19 LAB
APO A-I SERPL-MCNC: <6 MG/DL
ATRIAL RATE - MUSE: 66 BPM
CHOLEST SERPL-MCNC: 145 MG/DL
CREAT UR-MCNC: 99.6 MG/DL
CRP SERPL HS-MCNC: 0.36 MG/L
DIASTOLIC BLOOD PRESSURE - MUSE: NORMAL MMHG
FASTING STATUS PATIENT QL REPORTED: YES
FASTING STATUS PATIENT QL REPORTED: YES
GLUCOSE SERPL-MCNC: 96 MG/DL (ref 70–99)
HDLC SERPL-MCNC: 52 MG/DL
INTERPRETATION ECG - MUSE: NORMAL
LDLC SERPL CALC-MCNC: 70 MG/DL
MICROALBUMIN UR-MCNC: <12 MG/L
MICROALBUMIN/CREAT UR: NORMAL MG/G{CREAT}
NONHDLC SERPL-MCNC: 93 MG/DL
P AXIS - MUSE: 46 DEGREES
PR INTERVAL - MUSE: 182 MS
QRS DURATION - MUSE: 96 MS
QT - MUSE: 418 MS
QTC - MUSE: 438 MS
R AXIS - MUSE: 27 DEGREES
SYSTOLIC BLOOD PRESSURE - MUSE: NORMAL MMHG
T AXIS - MUSE: 36 DEGREES
TRIGL SERPL-MCNC: 113 MG/DL
VENTRICULAR RATE- MUSE: 66 BPM

## 2025-06-19 PROCEDURE — 86141 C-REACTIVE PROTEIN HS: CPT | Performed by: NURSE PRACTITIONER

## 2025-06-19 PROCEDURE — 83695 ASSAY OF LIPOPROTEIN(A): CPT | Performed by: NURSE PRACTITIONER

## 2025-06-19 PROCEDURE — 99000 SPECIMEN HANDLING OFFICE-LAB: CPT | Performed by: PATHOLOGY

## 2025-06-19 PROCEDURE — 82570 ASSAY OF URINE CREATININE: CPT | Performed by: NURSE PRACTITIONER

## 2025-06-19 RX ORDER — ROSUVASTATIN CALCIUM 40 MG/1
TABLET, COATED ORAL
COMMUNITY
Start: 2025-05-01

## 2025-06-19 RX ORDER — IBUPROFEN 600 MG/1
600 TABLET, FILM COATED ORAL
COMMUNITY
Start: 2025-05-08

## 2025-06-19 NOTE — LETTER
6/19/2025      RE: Brian Stratton  [id number] [facility] [id number]       Dear Colleague,    Thank you for the opportunity to participate in the care of your patient, Brian Stratton, at the [facility]. Please see a copy of my visit note below.      [facility] for Cardiovascular Disease Prevention - Exam Note    Active Problems   Patient Active Problem List    Diagnosis Date Noted     Hyperlipidemia      Priority: Medium     hx of          Reason For Visit   Patient here for [facility] early detection of atherosclerosis and CVD exam.    Pain Evaluation  Current history of pain associated with this visit is: left shoulder pain    Cardiac risk factors:    - age      - smoking      + elevated BMI        + Family history CVD         + Diet           + Hypertension    HPI   Brian Stratton is a 49 year old year old male with a history of a coronary artery calcium score of 76, 25th percentile in 2018, hyperlipidemia and left rotator cuff injury. He started taking rosuvastatin in 2018, 10 mg, currently taking 40 mg dose(past 2 months). LDL was 191 prior to starting statin.   His family history of CV disease includes his mother having hypertension and a MI at age 73,  his father having hyperlipidemia and a MI at age 70 and his brother having hyperlipidemia. His maternal grandmother had CVD. His son has congenital heart disease-TGA.  He was seen in the [facility] CV Prevention clinic in 2019, score 3.  His primary care provider is Dr. Naveen Ramos at [facility] in [place].  He stated taking compounded semaglutide 2 months ago and has lost 20 pounds. He works as an analyst in the pharmacy business. He had surgery on his left shoulder recently.   He feels a sharp pain in his chest 1-2x/year that lasts for seconds. Today in clinic he denies chest pain at rest, with  activity, while sleeping, SOB at rest, with activity or while sleeping, palpitations, lightheadedness, lower leg edema, calf cramps, indigestion, headaches or issues with his memory that he is concerned about. He states that he noticed changes with his memory post COVID19.      Nutrition assessment per patient report:   Foods with fat/cholesterol (fried foods, fatty meats, junk food):  0 servings   Fruits and vegetables (  cup cooked, 1 cup raw):  1-3 servngs of  vegetables/day. 2-4 servings of fruit/day  Caffeine (1 cup coffee, soda, etc):  1 cup of coffee/day, 1 coke 0/day  Alcohol servings (12 oz. beer, 4 oz. wine, 1  oz. in mixed drink):  0 servings  Special dietary habits:  gluten free  Typical breakfast:  protein shake, spinach, blueberries                 Lunch: skip                 Dinner: protein, beef 1x/week, chicken and fish, vegetables                 Snacks: dried fruits, figs, dates                 Drinks:  water  Activity  Patient is active walking 3-5 miles/day with dogs due to shoulder surgery    Sleep pattern: good    Laboratory Results Review  We discussed laboratory results today including lipids targets and how foods influence cholesterol.    Weight  His perceived healthy weight is 195 pounds.  A normal BMI of 25 is equal to 176 pounds.  The current BMI of 30 is high-risk range.  A weight reduction speed of 2-3 lbs per month for men is recommended. He has lost 20 pounds on semaglutide, goal weight 195.    PMH   Past Medical History:   Diagnosis Date     Agatston coronary artery calcium score less than 100     Total 76, Mild (25-49%)  proximal LAD and Mid LAD     Atypical chest pain      Hyperlipidemia     hx of      Knee injury 2018    torn meniscus       [facility]  Past Surgical History:   Procedure Laterality Date     AS ARTHROTOMY,OPEN REPAIR MENISCUS Right 08/2018     eye surgery  2005    laser     RHINOPLASTY  1997       Current Meds   Current Outpatient Medications   Medication Sig  Dispense Refill     ibuprofen (ADVIL/MOTRIN) 600 MG tablet Take 600 mg by mouth.       rosuvastatin (CRESTOR) 40 MG tablet        sertraline (ZOLOFT) 50 MG tablet Take 50 mg by mouth.       aspirin (ASA) 81 MG tablet Take 81 mg by mouth daily       EPINEPHrine (EPIPEN/ADRENACLICK/OR ANY BX GENERIC EQUIV) 0.3 MG/0.3ML injection 2-pack Inject 0.3 mg into the muscle as needed  1     fish oil-omega-3 fatty acids 1000 MG capsule Take 2 g by mouth daily       Multiple Vitamins-Minerals (MULTI FOR HIM PO)        valACYclovir (VALTREX) 500 MG tablet TK 1 T PO D  0       Allergies      Allergies   Allergen Reactions     Bees Shortness Of Breath       Family Hx   Family History   Problem Relation Age of Onset     Hypertension Mother      Hypoglycemia Mother      Coronary Artery Disease Mother 73        s/p MI     Atrial fibrillation Mother      Coronary Artery Disease Father 70        s/p MI     Hypoglycemia Father      Hyperlipidemia Father      Hyperlipidemia Brother      Pancreatic Cancer Maternal Grandmother      Cerebrovascular Disease Maternal Grandmother      Chronic Obstructive Pulmonary Disease Maternal Grandfather      Heart Failure Maternal Grandfather      Other - See Comments Paternal Grandmother         all her siblings  of CAD in 50's     Cancer Paternal Grandfather         related to job radiation exposure     Other - See Comments Half-Brother         over weight     Intellectual Disability Half-Brother      Other - See Comments Other         great uncles on moms side  in 30's of MI     Heart Disease Son         TGA       Social History    He is single, 2 sons    Tobacco History  History   Smoking Status     Never   Smokeless Tobacco     Never       ROS  General:  WDWN in NAD  EENT:  Denies visual disturbances, epistaxis, sore throat  Respiratory:  Denies SOB, cough, sputum production  Cardiovascular:  see HPI  GI:  Denies nausea, vomiting, hematemesis, melena  :  denies hematuria, dysuria  Skin:   "Denies rashes, lesions or open wounds  Psych:  Pleasant affect    Vital Signs   BP (!) 137/100 (Cuff Size: Adult Regular)   Ht 1.791 m (5' 10.5\")   Wt 96.2 kg (212 lb 1.6 oz)   SpO2 97%   BMI 30.00 kg/m        Waist: 40 inches  Hip: 42 inches    Physical Exam   In general, the patient is a pleasant male in no apparent distress   HEENT: NC/AT, PERRLA, EOMI, sclerae white, not injected. Nares clear, pharynx without erythema or exudate, dentition intact    Neck: No adenopathy, no thyromegaly, carotids +4/4 bilaterally without bruits,  no jugular venous distension   Lungs: Breath sounds clear bilaterally, without crackles, ronchi, or wheezes  Cor: RRR, S1S2 without murmur, rub, click, or gallop, the PMI is in the 5th ICS in the midclavicular line  Abdomen: Soft, nontender, nondistended, BS+ in all 4 quadrants, without hepatomegaly, no aorta or renal artery bruits  Extremities: No clubbing, cyanosis, or edema. DP and PT pulses +2/4 bilaterally    The 10-year ASCVD risk score (Abigail KHOA Jr., et al., 2013) is: HOBSON score 6.4%  Values used to calculate the score:   Age: 49 year old   Sex: male   Is Non- : No   Diabetic: No   Tobacco smoker: No   Systolic Blood Press: 137 mmHg   Is BP treated: No   HDL Cholesterol: 52 mg/dL   Total Cholesterol: 70 mg/dL    Recent Labs  Lab Results   Component Value Date    GLC 96 06/19/2025    GLC 96 02/01/2019      Lab Results   Component Value Date    NTBNP 11 02/01/2019     No results found for: \"NTBNPI\"   Lab Results   Component Value Date    UCRR 99.6 06/19/2025    UCRR 171 02/01/2019      Lab Results   Component Value Date    MICROL <12.0 06/19/2025    MICROL 10 02/01/2019      No results found for: \"MICROALBUMIN\"   Lab Results   Component Value Date    CRP 7.3 02/01/2019      Lab Results   Component Value Date    CHOL 145 06/19/2025    CHOL 172 02/01/2019      Lab Results   Component Value Date    TRIG 113 06/19/2025    TRIG 84 02/01/2019      Lab Results " "  Component Value Date    HDL 52 06/19/2025    HDL 46 02/01/2019      Lab Results   Component Value Date    LDL 70 06/19/2025     (H) 02/01/2019      No results found for: \"VLDL\"   No results found for: \"CHOLHDLRATIO\"  Lab Results   Component Value Date    NHDL 93 06/19/2025    NHDL 126 02/01/2019        Assessment:    Cardiovascular:  Asymptomatic, he is not complaining of chest pain, EKG revealed NSR, HR 67 bpm, no plaque detected in carotid arteries, CAC score 76, 25th percentile in 2018    Blood Pressure:  He does not take BP medication, -143/, BP at home 120-130, Montior BP at home every day for 2-4 weeks, record results and bring to PCP or our office.     Lipids:  He takes 40 mg of rosuvastatin, has been on a statin since 2018, check Lp(a), result <6   Latest Reference Range & Units 06/19/25 08:18   Cholesterol <200 mg/dL 145   Patient Fasting?  Yes  Yes   Glucose 70 - 99 mg/dL 96   HDL Cholesterol >=40 mg/dL 52   LDL Cholesterol Calculated <100 mg/dL 70   Non HDL Cholesterol <130 mg/dL 93   Triglycerides <150 mg/dL 113     Glucose: 96    Sleep pattern:  Sleep hygiene reviewed during clinic visit, handout given to patient    Weight Management: BMI 30, currently taking Semaglutide with 20 pound weight loss recently    Exercise: He plans to resume routine exercise program once he recovers from shoulder surgery    Return to Clinic: 5 years    Health Habit Summary:  Nutrition: Heart Healthy Eating:  most of the time   Exercise:  not exercising after shoulder surgery recently  Weight:  high-risk range  Tobacco Use:  never used    This case was presented to Dr. Grijalva and Dr. Janell Andres during our weekly conference.     60 minutes spent on the date of the encounter doing (chart review/review of outside records/review of test results/interpretation of tests/patient visit/documentation/discussion with other provider(s)   SENDY Tamayo CNP       CC  Patient Care Team:  Naveen Ramos MD as PCP " - General (Family Practice)  David Palmer APRN CNP as Nurse Practitioner (Cardiovascular Disease)  DAVID PALMER Test Results    WALKING BLOOD PRESSURE RESPONSE (3 minute, 5 MET level walk)   Pre BP: 110/72 mmHg  3 min BP: 126/60 mmHg  1 min post BP: 112/70 mmHg    Pre HR: 60 bpm  3 min HR: 92 bpm  1 min post HR: 70 bpm     Test results: Walking blood pressure response to 3 minutes activity is in normal range.     RETINAL VASCULAR ASSESSMENT   Left Eye Abnormality:  none  AV Ratio: 0.8    Right Eye Abnormality:  none  AV Ratio: 0.8     Retinal Assessment:  normal    ABDOMINAL AORTA ULTRASOUND (< 2.5 normal, borderline 2.5-2.9, abnormal > 3)   SupraIliac 1.91 cm    SupraRenal 2.05 cm    InfraRenal Proximal 1.97 cm    InfraRenal Distal 2.16 cm      Abdominal Aorta Assessment:  normal    LEFT VENTRICULAR ULTRASOUND MEASUREMENTS (adjusted for BSA)  LVIDD 52.9 mm   Septa 7.8 mm   Posterior 9.9 mm     Left Ventricular US Assessment:  normal    Carotid Artery IMT measurements report and plaques in the small area examined:   Left IMT 0.629 mm  Plaques none    Right IMT 0.816 mm  Plaques none     Test results: Carotid arteries wall thickening is in normal range with no plaque formations present.     ECG (see tracing):  normal sinus rhythm    Arterial Elasticity per age and gender (see printout):   C1 18.8 mL/mmHg x 10  normal   C2 7.6 mL/mmHg x 100 normal   Supine blood pressure: 143/98 mmHg     Test results: Arterial elasticity of the large and small size arteries is in normal range after adjusted for age and gender.     Ohara disease score: 1  Park Zapata    Please do not hesitate to contact me if you have any questions/concerns.     Sincerely,     SENDY Tamayo CNP

## 2025-06-19 NOTE — PROGRESS NOTES
[facility] for Cardiovascular Disease Prevention - Exam Note    Active Problems   Patient Active Problem List    Diagnosis Date Noted    Hyperlipidemia      Priority: Medium     hx of          Reason For Visit   Patient here for [facility] early detection of atherosclerosis and CVD exam.    Pain Evaluation  Current history of pain associated with this visit is: left shoulder pain    Cardiac risk factors:    - age      - smoking      + elevated BMI        + Family history CVD         + Diet           + Hypertension    HPI   Brian Stratton is a 49 year old year old male with a history of a coronary artery calcium score of 76, 25th percentile in 2018, hyperlipidemia and left rotator cuff injury. He started taking rosuvastatin in 2018, 10 mg, currently taking 40 mg dose(past 2 months). LDL was 191 prior to starting statin.   His family history of CV disease includes his mother having hypertension and a MI at age 73,  his father having hyperlipidemia and a MI at age 70 and his brother having hyperlipidemia. His maternal grandmother had CVD. His son has congenital heart disease-TGA.  He was seen in the [facility] CV Prevention clinic in 2019, score 3.  His primary care provider is Dr. Naveen Ramos at [facility] in [place].  He stated taking compounded semaglutide 2 months ago and has lost 20 pounds. He works as an analyst in the pharmacy business. He had surgery on his left shoulder recently.   He feels a sharp pain in his chest 1-2x/year that lasts for seconds. Today in clinic he denies chest pain at rest, with activity, while sleeping, SOB at rest, with activity or while sleeping, palpitations, lightheadedness, lower leg edema, calf cramps, indigestion, headaches or issues with his memory that he is concerned about. He states that he noticed changes with his memory post COVID19.      Nutrition assessment per patient report:   Foods with fat/cholesterol (fried foods, fatty meats, junk food):  0  servings   Fruits and vegetables (  cup cooked, 1 cup raw):  1-3 servngs of  vegetables/day. 2-4 servings of fruit/day  Caffeine (1 cup coffee, soda, etc):  1 cup of coffee/day, 1 coke 0/day  Alcohol servings (12 oz. beer, 4 oz. wine, 1  oz. in mixed drink):  0 servings  Special dietary habits:  gluten free  Typical breakfast:  protein shake, spinach, blueberries                 Lunch: skip                 Dinner: protein, beef 1x/week, chicken and fish, vegetables                 Snacks: dried fruits, figs, dates                 Drinks:  water  Activity  Patient is active walking 3-5 miles/day with dogs due to shoulder surgery    Sleep pattern: good    Laboratory Results Review  We discussed laboratory results today including lipids targets and how foods influence cholesterol.    Weight  His perceived healthy weight is 195 pounds.  A normal BMI of 25 is equal to 176 pounds.  The current BMI of 30 is high-risk range.  A weight reduction speed of 2-3 lbs per month for men is recommended. He has lost 20 pounds on semaglutide, goal weight 195.    PMH   Past Medical History:   Diagnosis Date    Agatston coronary artery calcium score less than 100     Total 76, Mild (25-49%)  proximal LAD and Mid LAD    Atypical chest pain     Hyperlipidemia     hx of     Knee injury 2018    torn meniscus       PS  Past Surgical History:   Procedure Laterality Date    AS ARTHROTOMY,OPEN REPAIR MENISCUS Right 08/2018    eye surgery  2005    laser    RHINOPLASTY  1997       Current Meds   Current Outpatient Medications   Medication Sig Dispense Refill    ibuprofen (ADVIL/MOTRIN) 600 MG tablet Take 600 mg by mouth.      rosuvastatin (CRESTOR) 40 MG tablet       sertraline (ZOLOFT) 50 MG tablet Take 50 mg by mouth.      aspirin (ASA) 81 MG tablet Take 81 mg by mouth daily      EPINEPHrine (EPIPEN/ADRENACLICK/OR ANY BX GENERIC EQUIV) 0.3 MG/0.3ML injection 2-pack Inject 0.3 mg into the muscle as needed  1    fish oil-omega-3 fatty  "acids 1000 MG capsule Take 2 g by mouth daily      Multiple Vitamins-Minerals (MULTI FOR HIM PO)       valACYclovir (VALTREX) 500 MG tablet TK 1 T PO D  0       Allergies      Allergies   Allergen Reactions    Bees Shortness Of Breath       Family Hx   Family History   Problem Relation Age of Onset    Hypertension Mother     Hypoglycemia Mother     Coronary Artery Disease Mother 73        s/p MI    Atrial fibrillation Mother     Coronary Artery Disease Father 70        s/p MI    Hypoglycemia Father     Hyperlipidemia Father     Hyperlipidemia Brother     Pancreatic Cancer Maternal Grandmother     Cerebrovascular Disease Maternal Grandmother     Chronic Obstructive Pulmonary Disease Maternal Grandfather     Heart Failure Maternal Grandfather     Other - See Comments Paternal Grandmother         all her siblings  of CAD in 50's    Cancer Paternal Grandfather         related to job radiation exposure    Other - See Comments Half-Brother         over weight    Intellectual Disability Half-Brother     Other - See Comments Other         great uncles on moms side  in 30's of MI    Heart Disease Son         TGA       Social History    He is single, 2 sons    Tobacco History  History   Smoking Status    Never   Smokeless Tobacco    Never       ROS  General:  WDWN in NAD  EENT:  Denies visual disturbances, epistaxis, sore throat  Respiratory:  Denies SOB, cough, sputum production  Cardiovascular:  see HPI  GI:  Denies nausea, vomiting, hematemesis, melena  :  denies hematuria, dysuria  Skin:  Denies rashes, lesions or open wounds  Psych:  Pleasant affect    Vital Signs   BP (!) 137/100 (Cuff Size: Adult Regular)   Ht 1.791 m (5' 10.5\")   Wt 96.2 kg (212 lb 1.6 oz)   SpO2 97%   BMI 30.00 kg/m        Waist: 40 inches  Hip: 42 inches    Physical Exam   In general, the patient is a pleasant male in no apparent distress   HEENT: NC/AT, PERRLA, EOMI, sclerae white, not injected. Nares clear, pharynx without erythema " "or exudate, dentition intact    Neck: No adenopathy, no thyromegaly, carotids +4/4 bilaterally without bruits,  no jugular venous distension   Lungs: Breath sounds clear bilaterally, without crackles, ronchi, or wheezes  Cor: RRR, S1S2 without murmur, rub, click, or gallop, the PMI is in the 5th ICS in the midclavicular line  Abdomen: Soft, nontender, nondistended, BS+ in all 4 quadrants, without hepatomegaly, no aorta or renal artery bruits  Extremities: No clubbing, cyanosis, or edema. DP and PT pulses +2/4 bilaterally    The 10-year ASCVD risk score (Abigail COUCH Jr., et al., 2013) is: HOBSON score 6.4%  Values used to calculate the score:   Age: 49 year old   Sex: male   Is Non- : No   Diabetic: No   Tobacco smoker: No   Systolic Blood Press: 137 mmHg   Is BP treated: No   HDL Cholesterol: 52 mg/dL   Total Cholesterol: 70 mg/dL    Recent Labs  Lab Results   Component Value Date    GLC 96 06/19/2025    GLC 96 02/01/2019      Lab Results   Component Value Date    NTBNP 11 02/01/2019     No results found for: \"NTBNPI\"   Lab Results   Component Value Date    UCRR 99.6 06/19/2025    UCRR 171 02/01/2019      Lab Results   Component Value Date    MICROL <12.0 06/19/2025    MICROL 10 02/01/2019      No results found for: \"MICROALBUMIN\"   Lab Results   Component Value Date    CRP 7.3 02/01/2019      Lab Results   Component Value Date    CHOL 145 06/19/2025    CHOL 172 02/01/2019      Lab Results   Component Value Date    TRIG 113 06/19/2025    TRIG 84 02/01/2019      Lab Results   Component Value Date    HDL 52 06/19/2025    HDL 46 02/01/2019      Lab Results   Component Value Date    LDL 70 06/19/2025     (H) 02/01/2019      No results found for: \"VLDL\"   No results found for: \"CHOLHDLRATIO\"  Lab Results   Component Value Date    NHDL 93 06/19/2025    NHDL 126 02/01/2019        Assessment:    Cardiovascular:  Asymptomatic, he is not complaining of chest pain, EKG revealed NSR, HR 67 bpm, no " plaque detected in carotid arteries, CAC score 76, 25th percentile in 2018    Blood Pressure:  He does not take BP medication, -143/, BP at home 120-130, Montior BP at home every day for 2-4 weeks, record results and bring to PCP or our office.     Lipids:  He takes 40 mg of rosuvastatin, has been on a statin since 2018, check Lp(a), result <6   Latest Reference Range & Units 06/19/25 08:18   Cholesterol <200 mg/dL 145   Patient Fasting?  Yes  Yes   Glucose 70 - 99 mg/dL 96   HDL Cholesterol >=40 mg/dL 52   LDL Cholesterol Calculated <100 mg/dL 70   Non HDL Cholesterol <130 mg/dL 93   Triglycerides <150 mg/dL 113     Glucose: 96    Sleep pattern:  Sleep hygiene reviewed during clinic visit, handout given to patient    Weight Management: BMI 30, currently taking Semaglutide with 20 pound weight loss recently    Exercise: He plans to resume routine exercise program once he recovers from shoulder surgery    Return to Clinic: 5 years    Health Habit Summary:  Nutrition: Heart Healthy Eating:  most of the time   Exercise:  not exercising after shoulder surgery recently  Weight:  high-risk range  Tobacco Use:  never used    This case was presented to Dr. Grijalva and Dr. Janell Andres during our weekly conference.     60 minutes spent on the date of the encounter doing (chart review/review of outside records/review of test results/interpretation of tests/patient visit/documentation/discussion with other provider(s)   SENDY Tamayo CNP       CC  Patient Care Team:  Naveen Ramos MD as PCP - General (Family Practice)  David Palmer APRN CNP as Nurse Practitioner (Cardiovascular Disease)  DAVID PALMER

## 2025-06-19 NOTE — PROGRESS NOTES
Ohara Test Results    WALKING BLOOD PRESSURE RESPONSE (3 minute, 5 MET level walk)   Pre BP: 110/72 mmHg  3 min BP: 126/60 mmHg  1 min post BP: 112/70 mmHg    Pre HR: 60 bpm  3 min HR: 92 bpm  1 min post HR: 70 bpm     Test results: Walking blood pressure response to 3 minutes activity is in normal range.     RETINAL VASCULAR ASSESSMENT   Left Eye Abnormality:  none  AV Ratio: 0.8    Right Eye Abnormality:  none  AV Ratio: 0.8     Retinal Assessment:  normal    ABDOMINAL AORTA ULTRASOUND (< 2.5 normal, borderline 2.5-2.9, abnormal > 3)   SupraIliac 1.91 cm    SupraRenal 2.05 cm    InfraRenal Proximal 1.97 cm    InfraRenal Distal 2.16 cm      Abdominal Aorta Assessment:  normal    LEFT VENTRICULAR ULTRASOUND MEASUREMENTS (adjusted for BSA)  LVIDD 52.9 mm   Septa 7.8 mm   Posterior 9.9 mm     Left Ventricular US Assessment:  normal    Carotid Artery IMT measurements report and plaques in the small area examined:   Left IMT 0.629 mm  Plaques none    Right IMT 0.816 mm  Plaques none     Test results: Carotid arteries wall thickening is in normal range with no plaque formations present.     ECG (see tracing):  normal sinus rhythm    Arterial Elasticity per age and gender (see printout):   C1 18.8 mL/mmHg x 10  normal   C2 7.6 mL/mmHg x 100 normal   Supine blood pressure: 143/98 mmHg     Test results: Arterial elasticity of the large and small size arteries is in normal range after adjusted for age and gender.     Ohara disease score: 1  Park Zapata

## 2025-06-26 NOTE — PATIENT INSTRUCTIONS
Screening Results Summary Report     [facility]    Thank you for choosing to participate in the prevention screening offered at the [facility]. Prevention screening is important part of health care.  Atherosclerosis may result in heart attacks, strokes, heart failure, peripheral artery disease and shortened life expectancy. The risk for premature development of this disease is both genetic (family history) and environmental (diet, exercise, lifestyle, etc.).  Goals of your cardiovascular prevention screening include detecting the earliest signs of blood vessel or heart abnormalities, and identifying markers for risk that can be treated if identified early. Recommendations are included to improve health habits. In some cases medication may be recommended to help slow progression of disease. Our goal is to assist you in prevention of a heart attack, stroke and other cardiovascular diseases that are the major cause of illness and mortality in our society.    Your total cholesterol and LDL (bad cholesterol) results are in the  optimal  range. Your other cholesterol numbers are also at goal.  We recommend continuation of ongoing health habit modification (heart healthy nutrition, maintaining your weight within a normal weight range and an exercise routine) to maintain these levels.  An ideal weight range is a body mass index of 25 or less. Your lipoprotein (a) level is <6 (normal range <29).  Continue to take 40 mg of rosuvastatin.    2. A BMI (body mass index) of over 30 increases the risk for heart attack, stroke, and diabetes.  Your BMI is 30 and adds to your risk.  A BMI of less than 30 is recommended.  The closer you can get to a BMI of 25 and still feel healthy for your musculature and frame size the better.  Weight reduction, even small amounts (5-10 pounds) can help reduce cholesterol levels.  For men, 2-3 pounds/month and for women, 1-2 pounds/month of weight  loss is recommended with an initial goal of approximately 10% reduction of your body weight per year.  Slow weight loss will increase the chances that you will maintain your weight over time.  A dramatic fluctuation in weight (up and down weight) increases your cardiovascular risk and is not recommended.  Overall, a healthy stable weight is preferred. You are currently taking Zepbound and have recently lost 20 pounds.     3.  Your resting blood pressures readings are elevated.  We recommend checking your blood pressure one/week, recording the results and bringing these results with you to your next clinic visit with your primary care provider or send your results to me.  A target blood pressure of 130/80mmHg or less for adequate BP control and 120/80 mmHg or less for optimal control.  Omron is good home blood pressure monitor brand to purchase.  This blood pressure cuff machine can be purchased at [employer] or [employer].     4. Your diet is heart healthy and well balanced.  We recommend increasing your intake of vegetables to 4-5 servings/day and increasing your fruit intake to 3-4 servings/day and incorporating healthy fats of the the Mediterranean diet into your diet. Eating salmon and using extra virgin olive oil are good examples. Nutrients found in fruits, vegetables and whole grains have been shown to be beneficial for the long-term health of your heart and blood vessels.     5.  The American Heart Association recommends 150 minutes of exercise per week, including strength (resistance) training.  Regular exercise can help maintain or lower cholesterol, blood pressure, blood glucose and improve the health of your heart and blood vessels. We recommend increasing your exercise routine to 5 days per week and adding a cardiovascular component to your exercise routine.  Always exercise within your comfort zone (no chest pain, able to talk comfortably). Once you have recovered from your shoulder surgery  plan to resume your exercise routine.     6. We compared your EKG results with the results from your EKG in 2019. The two are very similar.  No treatment or testing is needed at this time.     6.  We suggest that you consider incorporating 4-7-8 relaxation breathing, mindfulness stress reduction, meditation, yoga,and/or aromatherapy into your healthy lifestyle routine. All of these integrative therapies have been shown to be useful in reducing stress and promoting health. The website for the [facility] at the [facility] is [email or web address]. Taking Charge of Your Health and Wellbeing is a wonderful assessment tool to learn more about your wellbeing.    7.  Return to clinic in 5 years.    Thank you for choosing to participate in the prevention screening at [facility] Prevention clinic.  Cardiovascular prevention screening is important. Atherosclerosis may result in heart attacks, strokes, heart failure, peripheral artery disease.    Citlaly Winslow, DNP, APRN, FNP-C